# Patient Record
Sex: FEMALE | Race: BLACK OR AFRICAN AMERICAN | Employment: PART TIME | URBAN - METROPOLITAN AREA
[De-identification: names, ages, dates, MRNs, and addresses within clinical notes are randomized per-mention and may not be internally consistent; named-entity substitution may affect disease eponyms.]

---

## 2021-08-02 ENCOUNTER — HOSPITAL ENCOUNTER (OUTPATIENT)
Dept: NUTRITION | Age: 21
Discharge: HOME | End: 2021-08-02
Payer: COMMERCIAL

## 2021-08-02 PROCEDURE — 97802 MEDICAL NUTRITION INDIV IN: CPT

## 2021-08-02 NOTE — PROGRESS NOTES
[facility] [place] - Outpatient Nutrition Services  [address] [place], [place] [address], [address]  Phone: [phone] Fax: [phone]   Nutrition Assessment  Medical Nutrition Therapy   Outpatient Initial Evaluation         Patient Name: Giulia Silva : 2000   Treatment Diagnosis: obesity   Referral Source: Lorena Gomez MD [facility]): 2021     Gender: female Age: 22 y.o. Ht: 64 In Wt: 240 lb  kg   BMI: 41.2 BMR   Male  BMR Female 1823     Past Medical History:  n/a     Pertinent Medications:   Pt was prescribed Qsymia (per Dr note) but pt didn't list on her medication list     Biochemical Data:   No results found for: HBA1C, RLF4EQPY, VGN9CBGX  No results found for: NA, K, CL, CO2, AGAP, GLU, BUN, CREA, BUCR, GFRAA, GFRNA, CA, TBIL, TBILI, AP, TP, ALB, GLOB, AGRAT, ALT, AST  No results found for: CHOL, CHOLPOCT, CHOLX, CHLST, CHOLV, HDL, HDLPOC, HDLP, LDL, LDLCPOC, LDLC, DLDLP, VLDLC, VLDL, TGLX, TRIGL, TRIGP, TGLPOCT, CHHD, CHHDX  No results found for: ALT, AST, GGT, GGTP, AP, APIT, APX, CBIL, TBIL, TBILI  No results found for: TONY, CREAPOC, ACREA, CREA, REFC3, REFC4  No results found for: BUN, BUNPOC, IBUN, MBUNV, BUNV  No results found for: MCACR, MCA1, MCA2, MCA3, MCAU, MCAU2, MCALPOCT     Assessment:   Pt referred to nutrition counseling for assistance with weight loss. Pt reports weight struggles for most of her life (since high school). She currently lives at home with her parents and 2 sisters, who all struggle with their weight as well. She walks daily with her family but does not exercise formally otherwise. Pt in school at [employer] full time and works nights at [employer]. Pt's goal is to lose 80 lbs. She agreed to all goals and recommendations provided. Food & Nutrition: B (11:00)- dinner leftovers  L (4:00)- leftovers or [employer] sandwich +/- chips    Pt drinks lemonade and cranberry juice throughout the day.   She does not snack in between meals and is open to eating most foods. Estimate Needs   Calories: 1400  Protein: 105 Carbs: 140 Fat: 47   Kcal/day  g/day  g/day  g/day        percent: 30  40  30               Nutrition Diagnosis Nutrition Diagnosis: Obesity related to increased PO intake and lack of activity as evidenced by high BMI and unhealthy diet and exercise recall. Nutrition Intervention &  Education: Provided macronutrient education, including optimal times to eat throughout the day and appropriate balance of macronutrients to promote more efficient RMR. Discussed the importance of developing a consistent lifestyle, including eating habits for long term weight loss and management. Provided pt with meal builder and diet plan. Handouts Provided: []  Carbohydrates  []  Protein  []  Non-starchy Vegatbles  []  Food Label  []  Meal and Snack Ideas  []  Food Journals []  Diabetes  []  Cholesterol  []  Sodium  [x]  Meal Builder  [x]  Diet Plan  []  Others:   Information Reviewed with: pt   Readiness to Change Stage: []  Pre-contemplative    [x]  Contemplative  []  Preparation               []  Action                  []  Maintenance   Potential Barriers to Learning: []  Decline in memory    []  Language barrier   []  Other:  []  Emotional                  []  Limited mobility  []  Lack of motivation     [] Vision, hearing or cognitive impairment   Expected Compliance: Good     Nutritional Goal - To promote lifestyle changes to result in:    [x]  Weight loss  []  Improved diabetic control  []  Decreased cholesterol levels  []  Decreased blood pressure  []  Weight maintenance []  Preventing any interactions associated with food allergies  []  Adequate weight gain toward goal weight  []  Other:        Patient Goals:   1. Balance carb and protein at every meal/snack  2.  Eat 2-3 hrs after snacks and 4-5 hrs after meals     Dietitian Signature: Lesa Mcgee MS, RD Date: 8/2/2021   Follow-up:  Time: 2:38 PM

## 2021-09-28 ENCOUNTER — HOSPITAL ENCOUNTER (OUTPATIENT)
Dept: NUTRITION | Age: 21
Discharge: HOME | End: 2021-09-28
Payer: COMMERCIAL

## 2021-09-28 PROCEDURE — 97803 MED NUTRITION INDIV SUBSEQ: CPT

## 2021-09-30 NOTE — PROGRESS NOTES
NUTRITION - FOLLOW-UP TREATMENT NOTE  Patient Name: Eleazar Kulkarni         Date: 2021  : 2000    YES/NO Patient  Verified  Diagnosis: obesity   In time:   36             Out time:   1200   Total Treatment Time (min):   30     SUBJECTIVE/ASSESSMENT    Current Wt: 244.6 Previous Wt: 240 Wt Change: +4.6     Initial Wt:  Total Wt change:        Changes in medication or medical history? Any new allergies, surgeries or procedures?   /NO    If yes, update Summary List                Nutrition Diagnosis        Diagnosis Status: Nutrition Diagnosis: Obesity related to increased PO intake and lack of activity as evidenced by high BMI and unhealthy diet and exercise recall. []  Improved []  No Change    [x]  Declined        Nutrition Monitoring and Evaluation: Pt attributes weight gain to increased stress with the beginning of school. Pt's schedule does not allow for regular eating and she has not been successful consistently with planning/preparing meals in advance. In addition, pt is responsible for preparing meals for her younger sister, who is a very picky eater and refuses to contribute ideas or suggestions for meal options. Patient Education:  [x]  Review current plan with patient   []  Other:    Handouts/  Information Provided: []  Carbohydrates  []  Protein  []  Fiber  []  Serving Sizes  []  Fluids  []  General guidelines []  Diabetes  []  Cholesterol  []  Sodium  []  SBGM  []  Food Journals  []  Others:        Patient Goals  1. Follow specific eating schedule   2. Include 1 source carbohydrate with breakfast       PLAN    [x]  Continue on current plan []  Follow-up PRN   []  Discharge due to :    [x]  Next appt:  Oct 19     Dietitian: Emma Schaefer MS, RD    Date: 2021 Time: 12:11 PM

## 2021-10-19 ENCOUNTER — HOSPITAL ENCOUNTER (OUTPATIENT)
Dept: NUTRITION | Age: 21
Discharge: HOME | End: 2021-10-19
Payer: COMMERCIAL

## 2021-10-19 PROCEDURE — 97803 MED NUTRITION INDIV SUBSEQ: CPT

## 2021-10-19 NOTE — PROGRESS NOTES
NUTRITION - FOLLOW-UP TREATMENT NOTE  Patient Name: Deya Balderas         Date: 10/19/2021  : 2000    YES/NO Patient  Verified  Diagnosis: obesity   In time:   56             Out time:   1100   Total Treatment Time (min):   30     SUBJECTIVE/ASSESSMENT    Current Wt: 247.4 Previous Wt: 244. 6 Wt Change: +2.8     Initial Wt:  Total Wt change:        Changes in medication or medical history? Any new allergies, surgeries or procedures? YES/NO    If yes, update Summary List                Nutrition Diagnosis        Diagnosis Status: Nutrition Diagnosis: Obesity related to increased PO intake and lack of activity as evidenced by high BMI and unhealthy diet and exercise recall. []  Improved []  No Change    [x]  Declined        Nutrition Monitoring and Evaluation: Pt made improvements with meal planning and her sister has even provided suggestions and assistance. However, pt stated when her father prepares the meals, he doesn't always follow the plan. Pt working 8-2 on the weekends and doesn't take the time to break for a snack at the recommended times. Reiterated the importance of balancing carb and protein and eating at the scheduled times for more efficient RMR. Pt reported less exercise over the past few weeks as well. Patient Education:  [x]  Review current plan with patient   []  Other:    Handouts/  Information Provided: []  Carbohydrates  []  Protein  []  Fiber  []  Serving Sizes  []  Fluids  []  General guidelines []  Diabetes  []  Cholesterol  []  Sodium  []  SBGM  []  Food Journals  []  Others:        Patient Goals 1. Eat 6 svgs carbohydrate daily  2. Follow eating schedule  3.  Use food journal daily     PLAN    [x]  Continue on current plan []  Follow-up PRN   []  Discharge due to :    [x]  Next appt:      Dietitian: Landon Whitney MS, RD    Date: 10/19/2021 Time: 12:20 PM

## 2021-11-16 ENCOUNTER — HOSPITAL ENCOUNTER (OUTPATIENT)
Dept: NUTRITION | Age: 21
Discharge: HOME | End: 2021-11-16
Payer: COMMERCIAL

## 2021-11-16 PROCEDURE — 97803 MED NUTRITION INDIV SUBSEQ: CPT

## 2021-11-16 NOTE — PROGRESS NOTES
NUTRITION - FOLLOW-UP TREATMENT NOTE  Patient Name: Yolande Beverly         Date: 2021  : 2000    YES/NO Patient  Verified  Diagnosis: obesity   In time:   56             Out time:   1100   Total Treatment Time (min):   30     SUBJECTIVE/ASSESSMENT    Current Wt: 249.2 Previous Wt: 247.4 Wt Change: +1.8     Initial Wt:  Total Wt change:        Changes in medication or medical history? Any new allergies, surgeries or procedures? NO    If yes, update Summary List                Nutrition Diagnosis        Diagnosis Status: Nutrition Diagnosis: Obesity related to increased PO intake and lack of activity as evidenced by high BMI and unhealthy diet and exercise recall. 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 Pt also admitted to often eating below her budget of protein R/T not having enough planned to eat leftover. Pt planning small Thanksgiving with sister and her significant other. Patient Education:  [x]  Review current plan with patient   []  Other:    Handouts/  Information Provided: []  Carbohydrates  []  Protein  []  Fiber  []  Serving Sizes  []  Fluids  []  General guidelines []  Diabetes  []  Cholesterol  []  Sodium  []  SBGM  []  Food Journals  []  Others:        Patient Goals 1. Eat 2-3 hrs after snacks and 4-5 hrs after meals  2.  Plan ahead when possible     PLAN    [x]  Continue on current plan []  Follow-up PRN   []  Discharge due to :    [x]  Next appt:      Dietitian: Jacob Gage MS, RD    Date: 2021 Time: 3:02 PM

## 2021-12-21 ENCOUNTER — HOSPITAL ENCOUNTER (OUTPATIENT)
Dept: NUTRITION | Age: 21
Discharge: HOME | End: 2021-12-21
Payer: COMMERCIAL

## 2021-12-21 PROCEDURE — 97803 MED NUTRITION INDIV SUBSEQ: CPT

## 2021-12-21 NOTE — PROGRESS NOTES
NUTRITION - FOLLOW-UP TREATMENT NOTE  Patient Name: Haroldo Wallace         Date: 2021  : 2000    YES/NO Patient  Verified  Diagnosis: obesity   In time:   56             Out time:   1100   Total Treatment Time (min):   30     SUBJECTIVE/ASSESSMENT    Current Wt: 253.2 Previous Wt: 249. 2 Wt Change: +4     Initial Wt:  Total Wt change:        Changes in medication or medical history? Any new allergies, surgeries or procedures?   /NO    If yes, update Summary List                Nutrition Diagnosis        Diagnosis Status: Nutrition Diagnosis: Obesity related to increased PO intake and lack of activity as evidenced by high BMI and unhealthy diet and exercise recall. []  Improved []  No Change    [x]  Declined        Nutrition Monitoring and Evaluation: Pt reported she fell off track with her eating schedule over the past few weeks R/T increased stress with exams at school. Pt reported although her family agreed to each take a day to meal plan and prep for dinner, only she and her mother are actually doing it. Without dinner planned, pt often reaches for convenience foods which are typically higher in fat and carbohydrate. Pt pleased to report she joined the [employer] (short term membership) and also incorporates [employer] videos to exercise at home. Pt plans to take yoga next semester as an elective class. Pt opting to hold off for further nutrition counseling at this point. Instructed pt to reach back out at any time. Patient Education:  [x]  Review current plan with patient   []  Other:    Handouts/  Information Provided: []  Carbohydrates  []  Protein  []  Fiber  []  Serving Sizes  []  Fluids  []  General guidelines []  Diabetes  []  Cholesterol  []  Sodium  []  SBGM  []  Food Journals  [x]  Others: Breakfast recipes       Patient Goals 1. Keep canned/frozen food options as a back up  2.  Avoid eating holiday leftovers       PLAN    []  Continue on current plan [x]  Follow-up PRN   []  Discharge due to :    []  Next appt:      Dietitian: Jan Acuna MS, RD    Date: 12/21/2021 Time: 11:30 AM